# Patient Record
Sex: FEMALE | Race: WHITE
[De-identification: names, ages, dates, MRNs, and addresses within clinical notes are randomized per-mention and may not be internally consistent; named-entity substitution may affect disease eponyms.]

---

## 2019-12-06 ENCOUNTER — HOSPITAL ENCOUNTER (EMERGENCY)
Dept: HOSPITAL 56 - MW.ED | Age: 34
Discharge: HOME | End: 2019-12-06
Payer: MEDICAID

## 2019-12-06 DIAGNOSIS — Z79.899: ICD-10-CM

## 2019-12-06 DIAGNOSIS — K63.89: Primary | ICD-10-CM

## 2019-12-06 DIAGNOSIS — F17.210: ICD-10-CM

## 2019-12-06 LAB
BUN SERPL-MCNC: 13 MG/DL (ref 7–18)
CHLORIDE SERPL-SCNC: 101 MMOL/L (ref 98–107)
CO2 SERPL-SCNC: 24 MMOL/L (ref 21–32)
GLUCOSE SERPL-MCNC: 126 MG/DL (ref 74–106)
LIPASE SERPL-CCNC: 102 U/L (ref 73–393)
POTASSIUM SERPL-SCNC: 3.7 MMOL/L (ref 3.5–5.1)
SODIUM SERPL-SCNC: 137 MMOL/L (ref 136–145)

## 2019-12-06 PROCEDURE — 81001 URINALYSIS AUTO W/SCOPE: CPT

## 2019-12-06 PROCEDURE — 96361 HYDRATE IV INFUSION ADD-ON: CPT

## 2019-12-06 PROCEDURE — 84703 CHORIONIC GONADOTROPIN ASSAY: CPT

## 2019-12-06 PROCEDURE — 80053 COMPREHEN METABOLIC PANEL: CPT

## 2019-12-06 PROCEDURE — 74176 CT ABD & PELVIS W/O CONTRAST: CPT

## 2019-12-06 PROCEDURE — 36415 COLL VENOUS BLD VENIPUNCTURE: CPT

## 2019-12-06 PROCEDURE — 83690 ASSAY OF LIPASE: CPT

## 2019-12-06 PROCEDURE — 96375 TX/PRO/DX INJ NEW DRUG ADDON: CPT

## 2019-12-06 PROCEDURE — 96374 THER/PROPH/DIAG INJ IV PUSH: CPT

## 2019-12-06 PROCEDURE — 99284 EMERGENCY DEPT VISIT MOD MDM: CPT

## 2019-12-06 PROCEDURE — 85025 COMPLETE CBC W/AUTO DIFF WBC: CPT

## 2019-12-06 PROCEDURE — 80305 DRUG TEST PRSMV DIR OPT OBS: CPT

## 2019-12-06 NOTE — EDM.PDOC
ED HPI GENERAL MEDICAL PROBLEM





- General


Chief Complaint: Abdominal Pain


Stated Complaint: SEVERE ABDOMINAL PAIN


Time Seen by Provider: 19 00:35





- History of Present Illness


INITIAL COMMENTS - FREE TEXT/NARRATIVE: 


HISTORY AND PHYSICAL:





History of present illness:


Patient is a 34-year-old white female sensory concern of acute abdominal pain 

this is lower this poorly localized associated vomiting diarrhea fever or 

chills she denies vaginal discharge or irregular bleeding or other complaints.





Review of systems: 


As per history of present illness and below otherwise all systems reviewed and 

negative.





Past medical history: 


As per history of present illness and as reviewed below otherwise 

noncontributory.





Surgical history: 


As per history of present illness and as reviewed below otherwise 

noncontributory.





Social history: 


No reported history of drug or alcohol abuse.





Family history: 


As per history of present illness and as reviewed below otherwise 

noncontributory.





Physical exam:


HEENT: Atraumatic, normocephalic, pupils reactive, negative for conjunctival 

pallor or scleral icterus, mucous membranes moist, throat clear, neck supple, 

nontender, trachea midline.


Lungs: Clear to auscultation, breath sounds equal bilaterally, chest nontender.


Heart: S1S2, regular, negative for clicks, rubs, or JVD.


Abdomen: Soft, nondistended, nonlocalized tenderness across her lower abdomen 

no rebound no guarding Negative for masses or hepatosplenomegaly. Negative for 

costovertebral tenderness.


Pelvis: Stable nontender.


Genitourinary: Deferred.


Rectal: Deferred.


Extremities: Atraumatic, negative for cords or calf pain. Neurovascular 

unremarkable.


Neuro: Awake, alert, oriented. Cranial nerves II through XII unremarkable. 

Cerebellum unremarkable. Motor and sensory unremarkable throughout. Exam 

nonfocal.





Diagnostics:


CBC CMP UA hCG CT abdomen and pelvis





Therapeutics:


Saline 1 L bolus Toradol 30 mg IV Zofran 4 mg IV





Impression: 


#1 abdominal pain





Definitive disposition and diagnosis as appropriate pending reevaluation and 

review of above.





  ** Middle Abdomen


Pain Score (Numeric/FACES): 9





- Related Data


 Allergies











Allergy/AdvReac Type Severity Reaction Status Date / Time


 


No Known Allergies Allergy   Verified 19 00:43











Home Meds: 


 Home Meds





Omeprazole 20 mg PO DAILY 19 [History]











Past Medical History





- Infectious Disease History


Infectious Disease History: Reports: Chicken Pox





- Past Surgical History


Female  Surgical History: Reports:  Section





Social & Family History





- Family History


Family Medical History: Noncontributory





- Tobacco Use


Smoking Status *Q: Current Every Day Smoker


Years of Tobacco use: 9


Packs/Tins Daily: 0.5





- Caffeine Use


Caffeine Use: Reports: Coffee, Soda





- Recreational Drug Use


Recreational Drug Use: Yes


Recreational Drug Type: Reports: Marijuana/Hashish


Recreational Drug Use Frequency: Daily





ED ROS GENERAL





- Review of Systems


Review Of Systems: Comprehensive ROS is negative, except as noted in HPI.





ED EXAM, GENERAL





- Physical Exam


Exam: See Below (See dictation)





Course





- Vital Signs


Last Recorded V/S: 





 Last Vital Signs











Temp  35.9 C   19 02:10


 


Pulse  86   19 02:50


 


Resp  16   19 02:50


 


BP  105/69   19 02:50


 


Pulse Ox  98   19 02:50














- Orders/Labs/Meds


Labs: 





 Laboratory Tests











  19 Range/Units





  00:55 00:55 00:55 


 


WBC  16.98 H    (4.0-11.0)  K/uL


 


RBC  4.99    (4.30-5.90)  M/uL


 


Hgb  14.8    (12.0-16.0)  g/dL


 


Hct  42.9    (36.0-46.0)  %


 


MCV  86.0    (80.0-98.0)  fL


 


MCH  29.7    (27.0-32.0)  pg


 


MCHC  34.5    (31.0-37.0)  g/dL


 


RDW Std Deviation  41.9    (28.0-62.0)  fl


 


RDW Coeff of Bessie  14    (11.0-15.0)  %


 


Plt Count  328    (150-400)  K/uL


 


MPV  10.10    (7.40-12.00)  fL


 


Neut % (Auto)  66.7    (48.0-80.0)  %


 


Lymph % (Auto)  24.3    (16.0-40.0)  %


 


Mono % (Auto)  8.0    (0.0-15.0)  %


 


Eos % (Auto)  0.8    (0.0-7.0)  %


 


Baso % (Auto)  0.2    (0.0-1.5)  %


 


Neut # (Auto)  11.3 H    (1.4-5.7)  K/uL


 


Lymph # (Auto)  4.1 H    (0.6-2.4)  K/uL


 


Mono # (Auto)  1.4 H    (0.0-0.8)  K/uL


 


Eos # (Auto)  0.1    (0.0-0.7)  K/uL


 


Baso # (Auto)  0.0    (0.0-0.1)  K/uL


 


Sodium   137   (136-145)  mmol/L


 


Potassium   3.7   (3.5-5.1)  mmol/L


 


Chloride   101   ()  mmol/L


 


Carbon Dioxide   24.0   (21.0-32.0)  mmol/L


 


BUN   13   (7.0-18.0)  mg/dL


 


Creatinine   0.7   (0.6-1.0)  mg/dL


 


Est Cr Clr Drug Dosing   99.63   mL/min


 


Estimated GFR (MDRD)   > 60.0   ml/min


 


Glucose   126 H   ()  mg/dL


 


Calcium   8.7   (8.5-10.1)  mg/dL


 


Total Bilirubin   0.3   (0.2-1.0)  mg/dL


 


AST   14 L   (15-37)  IU/L


 


ALT   27   (14-63)  IU/L


 


Alkaline Phosphatase   97   ()  U/L


 


Total Protein   8.1   (6.4-8.2)  g/dL


 


Albumin   3.8   (3.4-5.0)  g/dL


 


Globulin   4.3 H   (2.6-4.0)  g/dL


 


Albumin/Globulin Ratio   0.9   (0.9-1.6)  


 


Lipase   102   ()  U/L


 


HCG, Qual    NEGATIVE  (NEG)  


 


Urine Color     


 


Urine Appearance     


 


Urine pH     (5.0-8.0)  


 


Ur Specific Gravity     (1.001-1.035)  


 


Urine Protein     (NEGATIVE)  mg/dL


 


Urine Glucose (UA)     (NEGATIVE)  mg/dL


 


Urine Ketones     (NEGATIVE)  mg/dL


 


Urine Occult Blood     (NEGATIVE)  


 


Urine Nitrite     (NEGATIVE)  


 


Urine Bilirubin     (NEGATIVE)  


 


Urine Urobilinogen     (<2.0)  EU/dL


 


Ur Leukocyte Esterase     (NEGATIVE)  


 


Urine RBC     (0-2/HPF)  


 


Urine WBC     (0-5/HPF)  


 


Ur Epithelial Cells     (NONE-FEW)  


 


Urine Bacteria     (NEGATIVE)  


 


Urine Opiates Screen     (NEGATIVE)  


 


Ur Oxycodone Screen     (NEGATIVE)  


 


Urine Methadone Screen     (NEGATIVE)  


 


Ur Barbiturates Screen     (NEGATIVE)  


 


Ur Phencyclidine Scrn     (NEGATIVE)  


 


Ur Amphetamine Screen     (NEGATIVE)  


 


U Methamphetamines Scrn     (NEGATIVE)  


 


U Benzodiazepines Scrn     (NEGATIVE)  


 


U Cocaine Metab Screen     (NEGATIVE)  


 


U Marijuana (THC) Screen     (NEGATIVE)  














  19 Range/Units





  01:00 01:00 


 


WBC    (4.0-11.0)  K/uL


 


RBC    (4.30-5.90)  M/uL


 


Hgb    (12.0-16.0)  g/dL


 


Hct    (36.0-46.0)  %


 


MCV    (80.0-98.0)  fL


 


MCH    (27.0-32.0)  pg


 


MCHC    (31.0-37.0)  g/dL


 


RDW Std Deviation    (28.0-62.0)  fl


 


RDW Coeff of Bessie    (11.0-15.0)  %


 


Plt Count    (150-400)  K/uL


 


MPV    (7.40-12.00)  fL


 


Neut % (Auto)    (48.0-80.0)  %


 


Lymph % (Auto)    (16.0-40.0)  %


 


Mono % (Auto)    (0.0-15.0)  %


 


Eos % (Auto)    (0.0-7.0)  %


 


Baso % (Auto)    (0.0-1.5)  %


 


Neut # (Auto)    (1.4-5.7)  K/uL


 


Lymph # (Auto)    (0.6-2.4)  K/uL


 


Mono # (Auto)    (0.0-0.8)  K/uL


 


Eos # (Auto)    (0.0-0.7)  K/uL


 


Baso # (Auto)    (0.0-0.1)  K/uL


 


Sodium    (136-145)  mmol/L


 


Potassium    (3.5-5.1)  mmol/L


 


Chloride    ()  mmol/L


 


Carbon Dioxide    (21.0-32.0)  mmol/L


 


BUN    (7.0-18.0)  mg/dL


 


Creatinine    (0.6-1.0)  mg/dL


 


Est Cr Clr Drug Dosing    mL/min


 


Estimated GFR (MDRD)    ml/min


 


Glucose    ()  mg/dL


 


Calcium    (8.5-10.1)  mg/dL


 


Total Bilirubin    (0.2-1.0)  mg/dL


 


AST    (15-37)  IU/L


 


ALT    (14-63)  IU/L


 


Alkaline Phosphatase    ()  U/L


 


Total Protein    (6.4-8.2)  g/dL


 


Albumin    (3.4-5.0)  g/dL


 


Globulin    (2.6-4.0)  g/dL


 


Albumin/Globulin Ratio    (0.9-1.6)  


 


Lipase    ()  U/L


 


HCG, Qual    (NEG)  


 


Urine Color  YELLOW   


 


Urine Appearance  CLEAR   


 


Urine pH  6.0   (5.0-8.0)  


 


Ur Specific Gravity  >= 1.030   (1.001-1.035)  


 


Urine Protein  NEGATIVE   (NEGATIVE)  mg/dL


 


Urine Glucose (UA)  NEGATIVE   (NEGATIVE)  mg/dL


 


Urine Ketones  NEGATIVE   (NEGATIVE)  mg/dL


 


Urine Occult Blood  SMALL H   (NEGATIVE)  


 


Urine Nitrite  NEGATIVE   (NEGATIVE)  


 


Urine Bilirubin  NEGATIVE   (NEGATIVE)  


 


Urine Urobilinogen  0.2   (<2.0)  EU/dL


 


Ur Leukocyte Esterase  NEGATIVE   (NEGATIVE)  


 


Urine RBC  2-3   (0-2/HPF)  


 


Urine WBC  0-1   (0-5/HPF)  


 


Ur Epithelial Cells  OCCASIONAL   (NONE-FEW)  


 


Urine Bacteria  RARE   (NEGATIVE)  


 


Urine Opiates Screen   NEGATIVE  (NEGATIVE)  


 


Ur Oxycodone Screen   NEGATIVE  (NEGATIVE)  


 


Urine Methadone Screen   NEGATIVE  (NEGATIVE)  


 


Ur Barbiturates Screen   NEGATIVE  (NEGATIVE)  


 


Ur Phencyclidine Scrn   NEGATIVE  (NEGATIVE)  


 


Ur Amphetamine Screen   NEGATIVE  (NEGATIVE)  


 


U Methamphetamines Scrn   NEGATIVE  (NEGATIVE)  


 


U Benzodiazepines Scrn   NEGATIVE  (NEGATIVE)  


 


U Cocaine Metab Screen   NEGATIVE  (NEGATIVE)  


 


U Marijuana (THC) Screen   POSITIVE  (NEGATIVE)  











Meds: 





Medications














Discontinued Medications














Generic Name Dose Route Start Last Admin





  Trade Name Hong  PRN Reason Stop Dose Admin


 


Sodium Chloride  1,000 mls @ 999 mls/hr  19 00:36  19 00:57





  Normal Saline  IV  19 01:36  999 mls/hr





  STAT ONE   Administration





     





     





     





     


 


Ketorolac Tromethamine  30 mg  19 00:59  19 01:05





  Toradol  IVPUSH  19 01:00  30 mg





  ONETIME ONE   Administration





     





     





     





     


 


Ondansetron HCl  4 mg  19 00:59  19 01:05





  Zofran  IVPUSH  19 01:00  4 mg





  ONETIME ONE   Administration





     





     





     





     














Departure





- Departure


Time of Disposition: 02:53


Disposition: Home, Self-Care 01


Condition: Good


Clinical Impression: 


 Abdominal pain, Epiploic appendagitis








- Discharge Information


Referrals: 


PCP,None [Primary Care Provider] - 


Additional Instructions: 


The following information is given to patients seen in the emergency department 

who are being discharged to home. This information is to outline your options 

for follow-up care. We provide all patients seen in our emergency department 

with a follow-up referral.





The need for follow-up, as well as the timing and circumstances, are variable 

depending upon the specifics of your emergency department visit.





If you don't have a primary care physician on staff, we will provide you with a 

referral. We always advise you to contact your personal physician following an 

emergency department visit to inform them of the circumstance of the visit and 

for follow-up with them and/or the need for any referrals to a consulting 

specialist.





The emergency department will also refer you to a specialist when appropriate. 

This referral assures that you have the opportunity for followup care with a 

specialist. All of these measure are taken in an effort to provide you with 

optimal care, which includes your followup.





Under all circumstances we always encourage you to contact your private 

physician who remains a resource for coordinating  your care. When calling for 

followup care, please make the office aware that this follow-up is from your 

recent emergency room visit. If for any reason you are refused follow-up, 

please contact the [facility] emergency department at [phone] 

and asked to speak to the emergency department charge nurse.




















Hydrocodone as prescribed and follow-up primary medical doctor return as needed 

as discussed

## 2019-12-06 NOTE — CT
INDICATION:



 Abdominal pain 



TECHNIQUE:



CT abdomen and pelvis without contrast.



COMPARISON:



None 



FINDINGS:



Lower chest: Unremarkable.  



Liver: Unremarkable.  



Spleen: Unremarkable.  



Pancreas: Unremarkable.  



Gallbladder and bile ducts: Unremarkable.  



Adrenal glands: Unremarkable.  



Kidneys: Unremarkable.  No kidney or ureteral stones and no hydronephrosis. 

 



GI tract: There is a 1.8 centimeter oval fat density with surrounding rim 

of enhancement adjacent to the mid sigmoid colon. There is mild fat 

stranding in this region. No colonic wall thickening. There is colonic 

diverticulosis without evidence for diverticulitis. Appendix is normal.  



Vascular structures: Unremarkable.  



Lymph nodes: Unremarkable.  



Miscellaneous:  Small fat containing umbilical hernia. No free air or 

significant free fluid.  



Pelvic Organs:  IUD in the uterus. 



Bones:  Unremarkable for age.  



IMPRESSION:



Intraperitoneal focal fat infarction adjacent to the midsigmoid colon. 



 



No evidence for diverticulitis. 



Small fat containing umbilical hernia. 



IUD within the uterus.



Please note that all CT scans at this facility use dose modulation, 

iterative reconstruction, and/or weight-based dosing when appropriate to 

reduce radiation dose to as low as reasonably achievable.



Dictated by Shereen Ford MD @ Dec  6 2019  2:17AM



Signed by Dr. Shereen Ford @ Dec  6 2019  2:23AM